# Patient Record
(demographics unavailable — no encounter records)

---

## 2024-10-10 NOTE — PLAN
[Cognitive and/or Behavior Therapy] : Cognitive and/or Behavior Therapy  [Motivational Interviewing] : Motivational Interviewing  [Psychodynamic Therapy] : Psychodynamic Therapy  [Psychoeducation] : Psychoeducation  [Skills training (all types)] : Skills training (all types)  [Supportive Therapy] : Supportive Therapy [FreeTextEntry4] : Kaushik Selby is a 50 yo M with PMH, single, no kids, unfinished high school (was in special ed), used to be a  for 30 years, last worked in 2017, lives alone in supportive housing since 2008, PMH of cellulitis, enuresis and urinary retention, s/p suprapubic catheter in 1/2024, hx of hypothyroid (previously on levothyroxine, states that his doctor states he no longer needs levothyroxine supplementation), PPH of reported bipolar disorder vs schizoaffective disorder bipolar type, hx of multiple inpatient psychiatric admissions (most recently in 2016 at [facility] IP for reported depression, previously in 2001 at [place] x2m, then transferred [facility] x4m involuntary, then transferred to [facility] x2m), hx outpt psychiatric treatment (at [facility] x18y, has had Dr. Miller in the past), hx of multiple medication trials (Seroquel, Depakote, Topamax, lithium; reports he has an allergy to haldol), family hx of biological father with reported bipolar disorder (r/o PTSD), prescribed Abilify 30 mg (x10 years) and Klonopin 0.5mg bid by [facility], who presents to [facility] outpatient for medication management.  On initial interview, patient describes hx consistent with bipolar disorder vs schizoaffective disorder bipolar type. Speech is pressured, suspected due to anxiety, but patient does not present as acutely manic, depressed, or overtly psychotic. Patient describes at least 1 prior hospitalization for carlie. Although pt denies a hx of AH/VH, patient presents as paranoid (unclear if due to psychosis or due to personality disorder) and describes a hx of possible erotomaniac delusions (states that >20 years ago, he felt that he needed to  a woman after 4 months that he states that he barely knew). Patient denies any current or prior SI/HI/NSSIB. Patient endorses daily nicotine use and caffeine use, denies other substance use. Discussed risks associated with chronic benzo use including but not limited to tolerance, withdrawal sxs, cognitive impairment. Discussed importance of adherence to abilify for mood stabilization. Pt expressed understanding and agreement with plan.   Impression: - schizoaffective disorder, bipolar type  - r/o bipolar disorder and paranoid personality disorder  - anxiety disorder, unspecified r/o SIMD with anxious distress - nicotine use disorder, cigarettes (<1/2 PPD) and nicotine vaping  - caffeine use   Plan:  #schizoaffective disorder, bipolar type r/o bipolar with paranoid PD  - continue ability 30 mg qdaily in AM, states this has been the best med for mood stabilization for him in past 10 years - extensive hx of prior meds, most of which he states helped at least somewhat - endorses allergy to haldol based on possible adverse reaction when 7yo  # anxiety disorder - decrease klonopin to 0.25 bid PRN (0.5mg tab, pt instructed to split in half); (previously was taking 0.5 mg qAM PRN, 0.25 mg qPM PRN); RAB discussed - ISTOP reviewed; of note, last script was klonopin 0.5 mg bid on 8/31/24; prior script was 0.5 mg bid on 06/01/24  #nicotine use disorder  - cigarette use and vaping use  - not interested in cutting down at this time   #caffeine use, 2-3 cups of coffee a day  - likely contributing to anxiety; discussed risks of caffeine for worsening anxiety (states this is why he was taking higher dose of klonopin in AM)  #f/u - f/u in 2 weeks for now  - [facility] pharmacy

## 2024-10-10 NOTE — REASON FOR VISIT
[Other Location: e.g. Home (Enter Location, City,State)___] : The patient, [unfilled], was located at [unfilled] at the time of the visit. [Patient] : Patient [FreeTextEntry1] : depression, loneliness

## 2024-10-10 NOTE — HISTORY OF PRESENT ILLNESS
[FreeTextEntry1] : Preferred phone (cell): [phone]  Cc: "I'm stable and medication compliant. I'm interested in switching clinics" (from [facility] outpatient to [facility] outpatient)  Kaushik Selby is a 52 yo M with PMH, single, no kids, unfinished high school (was in special ed), used to be a  for 30 years, last worked in 2017, lives alone in supportive housing since 2008, PMH of cellulitis, enuresis and urinary retention, s/p suprapubic catheter in 1/2024, hx of hypothyroid (previously on levothyroxine, states that his doctor states he no longer needs levothyroxine supplementation), PPH of reported bipolar disorder vs schizoaffective disorder bipolar type, hx of multiple inpatient psychiatric admissions (most recently in 2016 at [facility] for reported depression, previously in 2001 at [place] x2m, then transferred [facility] x4m involuntary, then transferred to [facility] x2m), hx outpt psychiatric treatment (at [facility] x18y, has had Dr. Miller in the past), hx of multiple medication trials (Seroquel, Depakote, Topamax, lithium; reports he has an allergy to haldol), family hx of biological father with reported bipolar disorder (r/o PTSD), prescribed Abilify 30 mg (x10 years) and Klonopin 0.5mg bid by [facility], who presents to [facility] outpatient for medication management.  On approach, pt presents as anxious-appearing, polite, and cooperative on interview. Speech was concrete, overproductive, somewhat overinclusive, at times difficult to follow from sentence to sentence (train of thought is not entirely linear). Of note, at times, pt seemed to misuse psychiatric terminology. Patient described that he was nervous since yesterday to come to appointment due to coming to a new doctor, but otherwise denied specific concerns or anxieties. Patient describes that he was a patient at [facility] for at least 18 years, but states thate he was frustrated about being at the clinic, and states that "I'm looking for a change" in his provider. Endorses frustration with getting his medications filled at [facility], and some difficulties reaching his psychiatrist at [facility]. Patient repeatedly stated that he has been adherent with his medications, and states that he has never been violent towards himself or anyone else. States that he has been on a number of psychiatric medications over the years, states that abilify works best for him, which he feels improves his mood stability. Patient states that he has been taking klonopin generally 1 tab (0.5 mg) in the morning, and a half a tab at night, but states that he takes it as needed. Patient states that he takes the higher dose of klonopin in the morning "because I take it with coffee" and states that otherwise he feels anxious after drinking coffee (2-3 cups a day). Discussed risks of caffeine use on worsening anxiety. Recommended cutting down on caffeine consumption/switching to decaf coffee, as well as cutting down on AM klonopin to half a tab. Patient expressed agreement with plan. Pt endorses daily smoking and vaping, not interested in cutting down at this time. Denies marijuana use or other substance use.  Patient describes that "manic" and "depressed" at times throughout the day. But when screened for current true manic and depressive sxs, pt denies current acute depression and carlie. When screened further for recent manic episodes, patient describes that he had decreased sleep need and subjective restlessness last when his mother passed away in 2021 (even this description seemed more consistent with grief/anxiety). Patient denies current or prior auditory or visual hallucinations. Superficially denies paranoia, but at times seems somewhat paranoid/distrustful on interview. Patient denies current or prior suicidal ideation, homicidal ideation, NSSIB. Denies access to guns/weapons.   Pt endorses adherence to medications, denies side effects.   Prior psychiatric hx: psychiatric hx, states that at 9yo, he was "clapping, twitching, and hyperactive," states that he had his first episode of depression at approximately 9 yo, reports he has been engaged in psychiatric treatment since.   Social hx: grew up in , moved to  in 1995. Completed JR year of HS, did not obtain GED.   Preferred RX: [facility] [address]  [FreeTextEntry2] : see above [FreeTextEntry3] : see above

## 2024-10-10 NOTE — PHYSICAL EXAM
[Average] : average [Cooperative] : cooperative [Anxious] : anxious [Irritable] : irritability [Overproductive] : overproductive [Linear/Goal Directed] : linear/goal directed [MR] : MR [WNL] : within normal limits [de-identified] : below average

## 2024-10-10 NOTE — HISTORY OF PRESENT ILLNESS
[FreeTextEntry1] : Preferred phone (cell): [phone]  Cc: "I'm stable and medication compliant. I'm interested in switching clinics" (from [facility] outpatient to [facility] outpatient)  Kaushik Selby is a 52 yo M with PMH, single, no kids, unfinished high school (was in special ed), used to be a  for 30 years, last worked in 2017, lives alone in supportive housing since 2008, PMH of cellulitis, enuresis and urinary retention, s/p suprapubic catheter in 1/2024, hx of hypothyroid (previously on levothyroxine, states that his doctor states he no longer needs levothyroxine supplementation), PPH of reported bipolar disorder vs schizoaffective disorder bipolar type, hx of multiple inpatient psychiatric admissions (most recently in 2016 at [facility] for reported depression, previously in 2001 at [place] x2m, then transferred [facility] x4m involuntary, then transferred to [facility] x2m), hx outpt psychiatric treatment (at [facility] x18y, has had Dr. Miller in the past), hx of multiple medication trials (Seroquel, Depakote, Topamax, lithium; reports he has an allergy to haldol), family hx of biological father with reported bipolar disorder (r/o PTSD), prescribed Abilify 30 mg (x10 years) and Klonopin 0.5mg bid by [facility], who presents to [facility] outpatient for medication management.  On approach, pt presents as anxious-appearing, polite, and cooperative on interview. Speech was concrete, overproductive, somewhat overinclusive, at times difficult to follow from sentence to sentence (train of thought is not entirely linear). Of note, at times, pt seemed to misuse psychiatric terminology. Patient described that he was nervous since yesterday to come to appointment due to coming to a new doctor, but otherwise denied specific concerns or anxieties. Patient describes that he was a patient at [facility] for at least 18 years, but states thate he was frustrated about being at the clinic, and states that "I'm looking for a change" in his provider. Endorses frustration with getting his medications filled at [facility] pharmacy, and some difficulties reaching his psychiatrist at [facility]. Patient repeatedly stated that he has been adherent with his medications, and states that he has never been violent towards himself or anyone else. States that he has been on a number of psychiatric medications over the years, states that abilify works best for him, which he feels improves his mood stability. Patient states that he has been taking klonopin generally 1 tab (0.5 mg) in the morning, and a half a tab at night, but states that he takes it as needed. Patient states that he takes the higher dose of klonopin in the morning "because I take it with coffee" and states that otherwise he feels anxious after drinking coffee (2-3 cups a day). Discussed risks of caffeine use on worsening anxiety. Recommended cutting down on caffeine consumption/switching to decaf coffee, as well as cutting down on AM klonopin to half a tab. Patient expressed agreement with plan. Pt endorses daily smoking and vaping, not interested in cutting down at this time. Denies marijuana use or other substance use.  Patient describes that "manic" and "depressed" at times throughout the day. But when screened for current true manic and depressive sxs, pt denies current acute depression and carlie. When screened further for recent manic episodes, patient describes that he had decreased sleep need and subjective restlessness last when his mother passed away in 2021 (even this description seemed more consistent with grief/anxiety). Patient denies current or prior auditory or visual hallucinations. Superficially denies paranoia, but at times seems somewhat paranoid/distrustful on interview. Patient denies current or prior suicidal ideation, homicidal ideation, NSSIB. Denies access to guns/weapons.   Pt endorses adherence to medications, denies side effects.   Prior psychiatric hx: psychiatric hx, states that at 9yo, he was "clapping, twitching, and hyperactive," states that he had his first episode of depression at approximately 7 yo, reports he has been engaged in psychiatric treatment since.   Social hx: grew up in , moved to  in 1995. Completed JR year of HS, did not obtain GED.   Preferred RX: [facility] [address] [facility]  [FreeTextEntry2] : see above [FreeTextEntry3] : see above

## 2024-10-10 NOTE — PHYSICAL EXAM
[Average] : average [Cooperative] : cooperative [Anxious] : anxious [Irritable] : irritability [Overproductive] : overproductive [Linear/Goal Directed] : linear/goal directed [MR] : MR [WNL] : within normal limits [de-identified] : below average

## 2024-10-10 NOTE — PLAN
[Cognitive and/or Behavior Therapy] : Cognitive and/or Behavior Therapy  [Motivational Interviewing] : Motivational Interviewing  [Psychodynamic Therapy] : Psychodynamic Therapy  [Psychoeducation] : Psychoeducation  [Skills training (all types)] : Skills training (all types)  [Supportive Therapy] : Supportive Therapy [FreeTextEntry4] : Kaushik Selby is a 50 yo M with PMH, single, no kids, unfinished high school (was in special ed), used to be a  for 30 years, last worked in 2017, lives alone in supportive housing since 2008, PMH of cellulitis, enuresis and urinary retention, s/p suprapubic catheter in 1/2024, hx of hypothyroid (previously on levothyroxine, states that his doctor states he no longer needs levothyroxine supplementation), PPH of reported bipolar disorder vs schizoaffective disorder bipolar type, hx of multiple inpatient psychiatric admissions (most recently in 2016 at [facility] IP for reported depression, previously in 2001 at [place] x2m, then transferred [facility] x4m involuntary, then transferred to [facility] x2m), hx outpt psychiatric treatment (at [facility] x18y, has had Dr. Miller in the past), hx of multiple medication trials (Seroquel, Depakote, Topamax, lithium; reports he has an allergy to haldol), family hx of biological father with reported bipolar disorder (r/o PTSD), prescribed Abilify 30 mg (x10 years) and Klonopin 0.5mg bid by [facility], who presents to [facility] outpatient for medication management.  On initial interview, patient describes hx consistent with bipolar disorder vs schizoaffective disorder bipolar type. Speech is pressured, suspected due to anxiety, but patient does not present as acutely manic, depressed, or overtly psychotic. Patient describes at least 1 prior hospitalization for carlie. Although pt denies a hx of AH/VH, patient presents as paranoid (unclear if due to psychosis or due to personality disorder) and describes a hx of possible erotomaniac delusions (states that >20 years ago, he felt that he needed to  a woman after 4 months that he states that he barely knew). Patient denies any current or prior SI/HI/NSSIB. Patient endorses daily nicotine use and caffeine use, denies other substance use. Discussed risks associated with chronic benzo use including but not limited to tolerance, withdrawal sxs, cognitive impairment. Discussed importance of adherence to abilify for mood stabilization. Pt expressed understanding and agreement with plan.   Impression: - schizoaffective disorder, bipolar type  - r/o bipolar disorder and paranoid personality disorder  - anxiety disorder, unspecified r/o SIMD with anxious distress - nicotine use disorder, cigarettes (<1/2 PPD) and nicotine vaping  - caffeine use   Plan:  #schizoaffective disorder, bipolar type r/o bipolar with paranoid PD  - continue ability 30 mg qdaily in AM, states this has been the best med for mood stabilization for him in past 10 years - extensive hx of prior meds, most of which he states helped at least somewhat - endorses allergy to haldol based on possible adverse reaction when 7yo  # anxiety disorder - decrease klonopin to 0.25 bid PRN (0.5mg tab, pt instructed to split in half); (previously was taking 0.5 mg qAM PRN, 0.25 mg qPM PRN); RAB discussed - ISTOP reviewed; of note, last script was klonopin 0.5 mg bid on 8/31/24; prior script was 0.5 mg bid on 06/01/24  #nicotine use disorder  - cigarette use and vaping use  - not interested in cutting down at this time   #caffeine use, 2-3 cups of coffee a day  - likely contributing to anxiety; discussed risks of caffeine for worsening anxiety (states this is why he was taking higher dose of klonopin in AM)  #f/u - f/u in 2 weeks for now  - [facility]

## 2024-10-10 NOTE — END OF VISIT
[] : Resident [Resident] : Resident [FreeTextEntry3] : Pt is seen and evaluated with resident. Agree with the written above. Treatment plan was discussed with the resident and pt

## 2024-10-16 NOTE — END OF VISIT
[Duration of Psychotherapy Visit (minutes spent in synchronous communication): ____] : Duration of Psychotherapy Visit (minutes spent in synchronous communication): [unfilled] [Individual Psychotherapy for 38-52 minutes] : Individual Psychotherapy for 38 - 52 minutes [FreeTextEntry3] : office [FreeTextEntry5] : office

## 2024-10-16 NOTE — PLAN
[facility] phone call message- general phone call:    Reason for call: Patient states he had stool sample done here but they don't see it so they are calling to check if he did get done here. Please call and advise.     Return call needed: Yes    OK to leave a message on voice mail? Yes, can leave a vm yes or no so they can let the pt know at his appt.     Primary language: English      needed? No    Call taken on September 22, 2017 at 11:14 AM by Everette Wood   [FreeTextEntry2] : treatment plan will be developed during the upcoming sessions [Cognitive and/or Behavior Therapy] : Cognitive and/or Behavior Therapy  [Motivational Interviewing] : Motivational Interviewing  [Psychodynamic Therapy] : Psychodynamic Therapy  [Psychoeducation] : Psychoeducation  [Skills training (all types)] : Skills training (all types)  [Supportive Therapy] : Supportive Therapy [de-identified] : The therapist met the patient in person for the session. The patient stated that he keeps himself busy, went to a sweet 16th, and engagement party. The patient was still interested in attending [employer] and stated that he didn't have a chance to go for a tour but will try to do it this week if his schedule permits. The patient asked lots of questions about his intake with the psychiatrist and why it was done the way it was done, as well as the patient was questioning why he was seen by two doctors instead of one. The patient was educated that the second doctor was a supervisor. The patient also stated, " i am not violent so". The patient was assured that there is no record of his violence so he shouldn't be concerned about it. The patient also asked the writer to contact his psychiatrist to inform him that the patient might not have enough medication and then stated that he should have enough till the next week when he sees his psychiatrist. In addition, the patient stated that he started seeing some dreams about his mother and will inform his psychiatrist during the upcoming visit. Overall the patinet stated that he is doing ok. The patient takes medications as prescribed. Throughout the session the patient was provided with active listening, motivational interviewing, emotional support and empathy.  [Recommended Frequency of Visits: ____] : Recommended frequency of visits: [unfilled] [Return in ____ week(s)] : Return in [unfilled] week(s) [FreeTextEntry1] : The therapist will meet the patient every 2-3 weeks

## 2024-10-16 NOTE — PLAN
[FreeTextEntry2] : treatment plan will be developed during the upcoming sessions [Cognitive and/or Behavior Therapy] : Cognitive and/or Behavior Therapy  [Motivational Interviewing] : Motivational Interviewing  [Psychodynamic Therapy] : Psychodynamic Therapy  [Psychoeducation] : Psychoeducation  [Skills training (all types)] : Skills training (all types)  [Supportive Therapy] : Supportive Therapy [de-identified] : The therapist met the patient in person for the session. The patient stated that he keeps himself busy, went to a sweet 16th, and engagement party. The patient was still interested in attending [employer] and stated that he didn't have a chance to go for a tour but will try to do it this week if his schedule permits. The patient asked lots of questions about his intake with the psychiatrist and why it was done the way it was done, as well as the patient was questioning why he was seen by two doctors instead of one. The patient was educated that the second doctor was a supervisor. The patient also stated, " i am not violent so". The patient was assured that there is no record of his violence so he shouldn't be concerned about it. The patient also asked the writer to contact his psychiatrist to inform him that the patient might not have enough medication and then stated that he should have enough till the next week when he sees his psychiatrist. In addition, the patient stated that he started seeing some dreams about his mother and will inform his psychiatrist during the upcoming visit. Overall the patinet stated that he is doing ok. The patient takes medications as prescribed. Throughout the session the patient was provided with active listening, motivational interviewing, emotional support and empathy.  [Recommended Frequency of Visits: ____] : Recommended frequency of visits: [unfilled] [Return in ____ week(s)] : Return in [unfilled] week(s) [FreeTextEntry1] : The therapist will meet the patient every 2-3 weeks

## 2024-10-16 NOTE — REASON FOR VISIT
[Other Location: e.g. Home (Enter Location, City,State)___] : The patient, [unfilled], was located at [unfilled] at the time of the visit. [FreeTextEntry4] : 10 am [FreeTextEntry5] : 1045 am [Patient] : Patient [FreeTextEntry1] : depression, loneliness

## 2024-10-22 NOTE — HISTORY OF PRESENT ILLNESS
[FreeTextEntry1] : Preferred phone (cell): [phone] Cc: "I'm a little anxious about all of the things I need to do" (errands)   Kaushik Selby is a 50 yo M with PMH, single, no kids, unfinished high school (was in special ed), used to be a  for 30 years, last worked in 2017, lives alone in supportive housing since 2008, PMH of cellulitis, enuresis and urinary retention, s/p suprapubic catheter in 1/2024, hx of hypothyroid (previously on levothyroxine, states that his doctor states he no longer needs levothyroxine supplementation), PPH of reported bipolar disorder vs schizoaffective disorder bipolar type, hx of multiple inpatient psychiatric admissions (most recently in 2016 at [facility] for reported depression, previously in 2001 at [place] x2m, then transferred [facility] x4m involuntary, then transferred to [facility] x2m), hx outpt psychiatric treatment (at [facility] x18y, has had Dr. Miller in the past), hx of multiple medication trials (Seroquel, Depakote, Topamax, lithium; reports he has an allergy to haldol), family hx of biological father with reported bipolar disorder (r/o PTSD), prescribed Abilify 30 mg (x10 years) and Klonopin 0.5mg bid by [facility], who presents to [facility] outpatient for medication management.  On approach, pt presents as mildly anxious-appearing (but less so compared to last appt), polite, and cooperative on interview. Speech was concrete.   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 Patient denies acute sxs of panic attacks, depression, or carlie. Sleep and appetite stable. Encouraged pt to avoid caffeine intake in afternoon/evening. Pt states that he continues to take klonopin 0.5 mg in AM and 0.25 in PM PRN, inconsistently. Extensively discussed risks of benzo use; pt willing to cut down to 0.25 bid PRN. Pt states he smokes about 3 cigarettes per day; discussed NRT, amenable to starting nicotine patch. Provided supportive psychotherapy. Patient denies current or prior suicidal ideation, homicidal ideation, NSSIB.   Pt endorses adherence to medications, denies side effects.   Preferred [facility]: [facility] [id number] [facility]

## 2024-10-22 NOTE — DISCUSSION/SUMMARY
[Low acute suicide risk] : Low acute suicide risk [Yes] : Yes [FreeTextEntry1] : Kaushik Selby is a 50 yo M with PMH, single, no kids, unfinished high school (was in special ed), used to be a  for 30 years, last worked in 2017, lives alone in supportive housing since 2008, PMH of cellulitis, enuresis and urinary retention, s/p suprapubic catheter in 1/2024, hx of hypothyroid (previously on levothyroxine, states that his doctor states he no longer needs levothyroxine supplementation), PPH of reported bipolar disorder vs schizoaffective disorder bipolar type, hx of multiple inpatient psychiatric admissions (most recently in 2016 at [facility] for reported depression, previously in 2001 at [place] x2m, then transferred [facility] x4m involuntary, then transferred to [facility] x2m), hx outpt psychiatric treatment (at [facility] x18y, has had Dr. Miller in the past), hx of multiple medication trials (Seroquel, Depakote, Topamax, lithium; reports he has an allergy to haldol), family hx of biological father with reported bipolar disorder (r/o PTSD), prescribed Abilify 30 mg (x10 years) and Klonopin 0.5mg bid by [facility], who presents to [facility] outpatient for medication management.  On initial interview, patient describes hx consistent with bipolar disorder vs schizoaffective disorder bipolar type. Speech is pressured, suspected due to anxiety, but patient does not present as acutely manic, depressed, or overtly psychotic. Patient describes at least 1 prior hospitalization for carlie. Although pt denies a hx of AH/VH, patient presents as paranoid (unclear if due to psychosis or due to personality disorder) and describes a hx of possible erotomaniac delusions (states that >20 years ago, he felt that he needed to  a woman after 4 months that he states that he barely knew). Patient denies any current or prior SI/HI/NSSIB. Patient endorses daily nicotine use and caffeine use, denies other substance use. Discussed risks associated with chronic benzo use including but not limited to tolerance, withdrawal sxs, cognitive impairment. Discussed importance of adherence to abilify for mood stabilization. Pt expressed understanding and agreement with plan.   Impression: - schizoaffective disorder, bipolar type  - r/o bipolar disorder and paranoid personality disorder  - anxiety disorder, unspecified r/o SIMD with anxious distress - nicotine use disorder, cigarettes (<1/2 PPD) and nicotine vaping  - caffeine use - possible cognitive impairment   Plan:  #schizoaffective disorder, bipolar type r/o bipolar with paranoid PD  - continue ability 30 mg qdaily in AM, states this has been the best med for mood stabilization for him in past 10 years - extensive hx of prior meds, most of which he states helped at least somewhat - endorses allergy to haldol based on possible adverse reaction when 9yo  # anxiety disorder - decrease klonopin to 0.25 bid PRN (0.5mg tab, pt instructed to split in half); (previously was taking 0.5 mg qAM PRN, 0.25 mg qPM PRN) - start gabapentin 300 mg bid PRN acute anxiety (alternative to klonopin); RAB discussed  - ISTOP reviewed; of note, last script was klonopin 0.5 mg bid on 8/31/24; prior script was 0.5 mg bid on 06/01/24  #nicotine use disorder  - cigarette use and vaping use  - start nicotine patch 7mg qdaily; RAB discussed   #caffeine use, 2-3 cups of coffee a day  - likely contributing to anxiety; discussed risks of caffeine for worsening anxiety (states this is why he was taking higher dose of klonopin in AM) - pt started drinking decaf coffee   #f/u - [facility] pharmacy

## 2024-10-22 NOTE — PHYSICAL EXAM
[Average] : average [Cooperative] : cooperative [Anxious] : anxious [Irritable] : irritability [Overproductive] : overproductive [Linear/Goal Directed] : linear/goal directed [MR] : MR [WNL] : within normal limits [de-identified] : below average

## 2024-11-12 NOTE — PLAN
[FreeTextEntry2] : Goal 1. The patient will recognize and improve daily symptoms of his anxiety, and bipolar 1 diagnosis as evidenced by an improvement in scores on mental health scales reviewed in therapy sessions every 1 year   Obj 1- The patient will attend therapy sessions every 2-3 weeks and will review symptoms and coping skills during sessions. The patient will practice 1 meaningful activity a day  Obj 2- The patient will take daily meds as prescribed and meet with psychiatrist as scheduled   Goal 2: The patient will increase socialization    Obj 1- The patient will start attending [facility] at least 1/week   Obj 2 - The therapist and the patient will discuss progress his socialization progress during therapy sessions     [Cognitive and/or Behavior Therapy] : Cognitive and/or Behavior Therapy  [Motivational Interviewing] : Motivational Interviewing  [Psychodynamic Therapy] : Psychodynamic Therapy  [Psychoeducation] : Psychoeducation  [Skills training (all types)] : Skills training (all types)  [Supportive Therapy] : Supportive Therapy [de-identified] : The therapist met the patient in person for the session. The patient reported a lot of dissatisfaction with the way the housing organization and case management operates at the organization that provides supportive housing to the patient. The patient spoke a lot about different issues, providing the names, titles, the dates and other details of the past situations, the patient spoke really fast, hardly allowing the therapist to talk. At some point the therapist had to stop the patient so that the patient would share his thoughts about the options/ideas to resolve the situation. The patient's main concern was that his case workers are changing every year, and he would like consistency. Also, he stated that his new  doesn't listen to the patient, his needs and concerns. The patient stated that he doesn't know what to do and if he needs to do anything not to make things worse. The patient was educated about non-for-profit organization and the fact that neither the patient nor therapist have control over the changes the patient has to go through at his supportive housing. The therapist asked the patient to focus on resolution of the situation and asked the patient if he feels that the letter to the organization with the request to accommodate the patient with the change of his new  would be something that the patient might benefit from. The patient stated "yes". The therapist wrote a letter to the case management with the request to change his . The patient stated that it was a good idea even though he doesn't think it will change match. In addition, the therapist and the patient spoke about possible goals for his treatment plan and the patient stated that his long-term goal is to increase socialization and work on his mood. The patient is planning to come for his sessions in person most of the time. The therapist reminded the patient about the info the therapist provided to the patient about the [facility] and the patient stated that he is working on it " i was too busy but i am interested" The patient takes medications as prescribed. Throughout the session the patient was provided with active listening, motivational interviewing, emotional support and empathy      *** The patient's treatment plan is late because he canceled his 10/31/24 appointment.   [Recommended Frequency of Visits: ____] : Recommended frequency of visits: [unfilled] [Return in ____ week(s)] : Return in [unfilled] week(s) [FreeTextEntry1] : The therapist will meet the patient every 2-3 weeks

## 2024-11-12 NOTE — PLAN
[FreeTextEntry2] : Goal 1. The patient will recognize and improve daily symptoms of his anxiety, and bipolar 1 diagnosis as evidenced by an improvement in scores on mental health scales reviewed in therapy sessions every 1 year   Obj 1- The patient will attend therapy sessions every 2-3 weeks and will review symptoms and coping skills during sessions. The patient will practice 1 meaningful activity a day  Obj 2- The patient will take daily meds as prescribed and meet with psychiatrist as scheduled   Goal 2: The patient will increase socialization    Obj 1- The patient will start attending [facility] at least 1/week   Obj 2 - The therapist and the patient will discuss progress his socialization progress during therapy sessions     [Cognitive and/or Behavior Therapy] : Cognitive and/or Behavior Therapy  [Motivational Interviewing] : Motivational Interviewing  [Psychodynamic Therapy] : Psychodynamic Therapy  [Psychoeducation] : Psychoeducation  [Skills training (all types)] : Skills training (all types)  [Supportive Therapy] : Supportive Therapy [de-identified] : The therapist met the patient in person for the session. The patient reported a lot of dissatisfaction with the way the housing organization and case management operates at the organization that provides supportive housing to the patient. The patient spoke a lot about different issues, providing the names, titles, the dates and other details of the past situations, the patient spoke really fast, hardly allowing the therapist to talk. At some point the therapist had to stop the patient so that the patient would share his thoughts about the options/ideas to resolve the situation. The patient's main concern was that his case workers are changing every year, and he would like consistency. Also, he stated that his new  doesn't listen to the patient, his needs and concerns. The patient stated that he doesn't know what to do and if he needs to do anything not to make things worse. The patient was educated about non-for-profit organization and the fact that neither the patient nor therapist have control over the changes the patient has to go through at his supportive housing. The therapist asked the patient to focus on resolution of the situation and asked the patient if he feels that the letter to the organization with the request to accommodate the patient with the change of his new  would be something that the patient might benefit from. The patient stated "yes". The therapist wrote a letter to the case management with the request to change his . The patient stated that it was a good idea even though he doesn't think it will change match. In addition, the therapist and the patient spoke about possible goals for his treatment plan and the patient stated that his long-term goal is to increase socialization and work on his mood. The patient is planning to come for his sessions in person most of the time. The therapist reminded the patient about the info the therapist provided to the patient about the [facility] and the patient stated that he is working on it " i was too busy but i am interested" The patient takes medications as prescribed. Throughout the session the patient was provided with active listening, motivational interviewing, emotional support and empathy      *** The patient's treatment plan is late because he canceled his 10/31/24 appointment.   [Recommended Frequency of Visits: ____] : Recommended frequency of visits: [unfilled] [Return in ____ week(s)] : Return in [unfilled] week(s) [FreeTextEntry1] : The therapist will meet the patient every 2-3 weeks

## 2024-11-12 NOTE — ADDENDUM
[FreeTextEntry1] : The therapist met the patient in person for the session. The patient reported a lot of dissatisfaction with the way the housing organization and case management operates at the organization that provides supportive housing to the patient. The patient spoke a lot about different issues, providing the names, titles, the dates and other details of the past situations, the patient spoke really fast, hardly allowing the therapist to talk. At some point the therapist had to stop the patient so that the patient would share his thoughts about the options/ideas to resolve the situation. The patient's main concern was that his case workers are changing every year, and he would like consistency. Also, he stated that his new  doesn't listen to the patient, his needs and concerns. The patient stated that he doesn't know what to do and if he needs to do anything not to make things worse. The patient was educated about non-for-profit organization and the fact that neither the patient nor therapist have control over the changes the patient has to go through at his supportive housing. The therapist asked the patient to focus on resolution of the situation and asked the patient if he feels that the letter to the organization with the request to accommodate the patient with the change of his new  would be something that the patient might benefit from. The patient stated "yes". The therapist wrote a letter to the case management with the request to change his . The patient stated that it was a good idea even though he doesn't think it will change match. In addition, the therapist and the patient spoke about possible goals for his treatment plan and the patient stated that his long-term goal is to increase socialization and work on his mood. The patient is planning to come for his sessions in person most of the time. The therapist reminded the patient about the info the therapist provided to the patient about the [facility] and the patient stated that he is working on it " i was too busy but i am interested" The patient takes medications as prescribed. Throughout the session the patient was provided with active listening, motivational interviewing, emotional support and empathy

## 2024-11-12 NOTE — DISCUSSION/SUMMARY
[Initial Plan] : Initial Plan [Attempting to realize their potential] : Attempting to realize their potential [Motivated to maintain or improve physical health] : motivated to maintain or improve physical health [FreeTextEntry2] : 11/12/25 [FreeTextEntry3] : 10/9/24 [FreeTextEntry8] : no barriers [Mental Health] : Mental Health [Social/Leisure] : Social/Leisure [Initial] : Initial [every ___ months] : every [unfilled] months [every ___ weeks] : every [unfilled] weeks [Other rationale for transition/discharge:] : Other rationale for transition/discharge: [None - Reason others did not participate:] : None - Reason others did not participate:  [Yes] : Yes [Psychiatric Provider/Prescriber] : Psychiatric Provider/Prescriber [Therapist] : Therapist [Supervisor (if needed)] : Supervisor [FreeTextEntry1] : feels lonely [FreeTextEntry4] : Goal 2: The patient will increase socialization   [de-identified] : The patient will send out her resume weekly.   [de-identified] : 11/12/25 [de-identified] : The patient will discuss the progress of her job search during therapy sessions bi-weekly.  [de-identified] : 11/12/25 [FreeTextEntry5] : The therapist will utilize CBT techniques, Psychoeducation, Motivational interviewing and Supportive therapy  [de-identified] : Dr. Adler [de-identified] : Po [de-identified] : When a patient no longer requires individual psychotherapy and medication in order to perform at baseline, the patient will be discharged.    [de-identified] : not clinically necessary

## 2024-11-12 NOTE — DISCUSSION/SUMMARY
[Initial Plan] : Initial Plan [Attempting to realize their potential] : Attempting to realize their potential [Motivated to maintain or improve physical health] : motivated to maintain or improve physical health [FreeTextEntry2] : 11/12/25 [FreeTextEntry3] : 10/9/24 [FreeTextEntry8] : no barriers [Mental Health] : Mental Health [Social/Leisure] : Social/Leisure [Initial] : Initial [every ___ months] : every [unfilled] months [every ___ weeks] : every [unfilled] weeks [Other rationale for transition/discharge:] : Other rationale for transition/discharge: [None - Reason others did not participate:] : None - Reason others did not participate:  [Yes] : Yes [Psychiatric Provider/Prescriber] : Psychiatric Provider/Prescriber [Therapist] : Therapist [Supervisor (if needed)] : Supervisor [FreeTextEntry1] : feels lonely [FreeTextEntry4] : Goal 2: The patient will increase socialization   [de-identified] : The patient will send out her resume weekly.   [de-identified] : 11/12/25 [de-identified] : The patient will discuss the progress of her job search during therapy sessions bi-weekly.  [de-identified] : 11/12/25 [FreeTextEntry5] : The therapist will utilize CBT techniques, Psychoeducation, Motivational interviewing and Supportive therapy  [de-identified] : Dr. Adler [de-identified] : Po [de-identified] : When a patient no longer requires individual psychotherapy and medication in order to perform at baseline, the patient will be discharged.    [de-identified] : not clinically necessary

## 2024-11-12 NOTE — ADDENDUM
[FreeTextEntry1] : The therapist met the patient in person for the session. The patient reported a lot of dissatisfaction with the way the housing organization and case management operates at the organization that provides supportive housing to the patient. The patient spoke a lot about different issues, providing the names, titles, the dates and other details of the past situations, the patient spoke really fast, hardly allowing the therapist to talk. At some point the therapist had to stop the patient so that the patient would share his thoughts about the options/ideas to resolve the situation. The patient's main concern was that his case workers are changing every year, and he would like consistency. Also, he stated that his new  doesn't listen to the patient, his needs and concerns. The patient stated that he doesn't know what to do and if he needs to do anything not to make things worse. The patient was educated about non-for-profit organization and the fact that neither the patient nor therapist have control over the changes the patient has to go through at his supportive housing. The therapist asked the patient to focus on resolution of the situation and asked the patient if he feels that the letter to the organization with the request to accommodate the patient with the change of his new  would be something that the patient might benefit from. The patient stated "yes". The therapist wrote a letter to the case management with the request to change his . The patient stated that it was a good idea even though he doesn't think it will change match. In addition, the therapist and the patient spoke about possible goals for his treatment plan and the patient stated that his long-term goal is to increase socialization and work on his mood. The patient is planning to come for his sessions in person most of the time. The therapist reminded the patient about the info the therapist provided to the patient about the [facility] and the patient stated that he is working on it " i was too busy but i am interested" The patient takes medications as prescribed. Throughout the session the patient was provided with active listening, motivational interviewing, emotional support and empathy

## 2024-11-19 NOTE — DISCUSSION/SUMMARY
[Low acute suicide risk] : Low acute suicide risk [Yes] : Yes [FreeTextEntry1] : Kaushik Selby is a 52 yo M with PMH, single, no kids, unfinished high school (was in special ed), used to be a  for 30 years, last worked in 2017, lives alone in supportive housing since 2008, PMH of cellulitis, enuresis and urinary retention, s/p suprapubic catheter in 1/2024, hx of hypothyroid (previously on levothyroxine, states that his doctor states he no longer needs levothyroxine supplementation), PPH of reported bipolar disorder vs schizoaffective disorder bipolar type, hx of multiple inpatient psychiatric admissions (most recently in 2016 at [facility] IP for reported depression, previously in 2001 at [place] x2m, then transferred [facility] x4m involuntary, then transferred to [facility] x2m), hx outpt psychiatric treatment (at [facility] x18y, has had Dr. Miller in the past), hx of multiple medication trials (Seroquel, Depakote, Topamax, lithium; reports he has an allergy to haldol), family hx of biological father with reported bipolar disorder (r/o PTSD), prescribed Abilify 30 mg (x10 years) and Klonopin 0.5mg bid by [facility], who presents to [facility] outpatient for medication management.  On initial interview, patient describes hx consistent with bipolar disorder vs schizoaffective disorder bipolar type. Speech is pressured, suspected due to anxiety, but patient does not present as acutely manic, depressed, or overtly psychotic. Patient describes at least 1 prior hospitalization for carlie. Although pt denies a hx of AH/VH, patient presents as paranoid (unclear if due to psychosis or due to personality disorder) and describes a hx of possible erotomaniac delusions (states that >20 years ago, he felt that he needed to  a woman after 4 months that he states that he barely knew). Patient denies any current or prior SI/HI/NSSIB. Patient endorses daily nicotine use and caffeine use, denies other substance use. Discussed risks associated with chronic benzo use including but not limited to tolerance, withdrawal sxs, cognitive impairment. Discussed importance of adherence to abilify for mood stabilization. Pt expressed understanding and agreement with plan.   Impression: - schizoaffective disorder, bipolar type  - r/o bipolar disorder and paranoid personality disorder  - anxiety disorder, unspecified r/o SIMD with anxious distress - nicotine use disorder, cigarettes (<1/2 PPD) and nicotine vaping  - caffeine use, likely worsening anxiety  - possible cognitive impairment   Plan:  #schizoaffective disorder, bipolar type r/o bipolar with paranoid PD  - continue ability 30 mg qdaily in AM, states this has been the best med for mood stabilization for him in past 10 years - extensive hx of prior meds, most of which he states helped at least somewhat - endorses allergy to haldol based on possible adverse reaction when 7yo  # anxiety disorder - Klonopin taper as follows 0.25 qhs x7d for week 1 (0.5mg tab, pt instructed to split in half), then 0.25 q2days x7d for week 2, then 0.25 q3days x7d for week 3, then 0.25 qhs q4 days for week 4, then stop.  - c/w gabapentin 300 mg bid PRN acute anxiety (alternative to klonopin); RAB discussed  - ISTOP reviewed; of note, last script was klonopin 0.5 mg bid on 8/31/24; prior script was 0.5 mg bid on 06/01/24  #nicotine use disorder  - cigarette use (about 2 cigarettes per day) and vaping use  - start nicotine patch 7mg qdaily; RAB discussed  - start nicotine lozenge 2mg qhs PRN   #caffeine use, 2-3 cups of coffee a day - decreasing  - likely contributing to anxiety; discussed risks of caffeine for worsening anxiety (states this is why he was taking higher dose of klonopin in AM) - pt started drinking decaf coffee   #[facility]

## 2024-11-19 NOTE — HISTORY OF PRESENT ILLNESS
[FreeTextEntry1] : Preferred phone (cell): [phone] Cc: "I'm more tuned in" (more focused)   Kaushik Selby is a 52 yo M with PMH, single, no kids, unfinished high school (was in special ed), used to be a  for 30 years, last worked in 2017, lives alone in supportive housing since 2008, PMH of cellulitis, enuresis and urinary retention, s/p suprapubic catheter in 1/2024, hx of hypothyroid (previously on levothyroxine, states that his doctor states he no longer needs levothyroxine supplementation), PPH of reported bipolar disorder vs schizoaffective disorder bipolar type, hx of multiple inpatient psychiatric admissions (most recently in 2016 at [facility] for reported depression, previously in 2001 at [place] x2m, then transferred [facility] x4m involuntary, then transferred to [facility] x2m), hx outpt psychiatric treatment (at [facility] x18y, has had Dr. Miller in the past), hx of multiple medication trials (Seroquel, Depakote, Topamax, lithium; reports he has an allergy to haldol), family hx of biological father with reported bipolar disorder (r/o PTSD), prescribed Abilify 30 mg (x10 years) and Klonopin 0.5mg bid by [facility], who presents to [facility] outpatient for medication management.  On interview, pt presents as polite, and cooperative on interview. Speech was concrete.  Pt describes improved sxs of anxiety since starting gabapentin; states that he also decreased his coffee consumption. States that he would like to get off of klonopin, because it makes him feel "stoned." Discussed risks/benefits of klonopin and plan for taper. Patient denies acute sxs of panic attacks, depression, or carlie. Sleep and appetite stable. States that he would also like to cut down on cigarettes and vaping, but hasn't started patch yet. Recommended patch and lozenge. Provided supportive psychotherapy. Patient denies current or prior suicidal ideation, homicidal ideation, NSSIB.   Pt endorses adherence to medications, denies side effects.   Preferred RX: [facility] [id number] [facility]

## 2024-11-19 NOTE — PHYSICAL EXAM
[Average] : average [Cooperative] : cooperative [Anxious] : anxious [Irritable] : irritability [Overproductive] : overproductive [Linear/Goal Directed] : linear/goal directed [MR] : MR [WNL] : within normal limits [de-identified] : below average

## 2024-11-27 NOTE — PLAN
[FreeTextEntry2] : Goal 1. The patient will recognize and improve daily symptoms of his anxiety, and bipolar 1 diagnosis as evidenced by an improvement in scores on mental health scales reviewed in therapy sessions every 1 year   Obj 1- The patient will attend therapy sessions every 2-3 weeks and will review symptoms and coping skills during sessions. The patient will practice 1 meaningful activity a day  Obj 2- The patient will take daily meds as prescribed and meet with psychiatrist as scheduled   Goal 2: The patient will increase socialization    Obj 1- The patient will start attending [facility] at least 1/week   Obj 2 - The therapist and the patient will discuss progress his socialization progress during therapy sessions     [Cognitive and/or Behavior Therapy] : Cognitive and/or Behavior Therapy  [Motivational Interviewing] : Motivational Interviewing  [Psychodynamic Therapy] : Psychodynamic Therapy  [Psychoeducation] : Psychoeducation  [Skills training (all types)] : Skills training (all types)  [Supportive Therapy] : Supportive Therapy [de-identified] : The therapist met the patient for the session in person. During the session the patient was focused on his "new issue" and that is his newly assigned  at his supportive housing. The patient shared that he didn't feel comfortable having a new . During the last session the patient was provided with a letter stating that he wanted to switch to a different worker and the patient was informed that the patient wouldn't be accommodated because that wasn't an option. During the session the therapist requested the therapist to call Rosie at [phone] to inform her that the patient is willing to work with the new  but wasn't willing to come to the conference on Dec 6 or 9. The therapist contacted the person and left a detailed voicemail. During the session the therapist and the patient discussed the situation at length, the therapist encouraged the patient to try to work with whoever was assigned to him since that is the only option, the patient finally agreed. During the session lots of support and education were provided during the session. The patient takes medications as prescribed. Throughout the session the patient was provided with active listening, motivational interviewing, emotional support and empathy.  [Recommended Frequency of Visits: ____] : Recommended frequency of visits: [unfilled] [Return in ____ week(s)] : Return in [unfilled] week(s) [FreeTextEntry1] : The therapist will meet the patient every 2-3 weeks

## 2024-11-27 NOTE — PLAN
[FreeTextEntry2] : Goal 1. The patient will recognize and improve daily symptoms of his anxiety, and bipolar 1 diagnosis as evidenced by an improvement in scores on mental health scales reviewed in therapy sessions every 1 year   Obj 1- The patient will attend therapy sessions every 2-3 weeks and will review symptoms and coping skills during sessions. The patient will practice 1 meaningful activity a day  Obj 2- The patient will take daily meds as prescribed and meet with psychiatrist as scheduled   Goal 2: The patient will increase socialization    Obj 1- The patient will start attending [facility] at least 1/week   Obj 2 - The therapist and the patient will discuss progress his socialization progress during therapy sessions     [Cognitive and/or Behavior Therapy] : Cognitive and/or Behavior Therapy  [Motivational Interviewing] : Motivational Interviewing  [Psychodynamic Therapy] : Psychodynamic Therapy  [Psychoeducation] : Psychoeducation  [Skills training (all types)] : Skills training (all types)  [Supportive Therapy] : Supportive Therapy [de-identified] : The therapist met the patient for the session in person. During the session the patient was focused on his "new issue" and that is his newly assigned  at his supportive housing. The patient shared that he didn't feel comfortable having a new . During the last session the patient was provided with a letter stating that he wanted to switch to a different worker and the patient was informed that the patient wouldn't be accommodated because that wasn't an option. During the session the therapist requested the therapist to call Rosie at [phone] to inform her that the patient is willing to work with the new  but wasn't willing to come to the conference on Dec 6 or 9. The therapist contacted the person and left a detailed voicemail. During the session the therapist and the patient discussed the situation at length, the therapist encouraged the patient to try to work with whoever was assigned to him since that is the only option, the patient finally agreed. During the session lots of support and education were provided during the session. The patient takes medications as prescribed. Throughout the session the patient was provided with active listening, motivational interviewing, emotional support and empathy.  [Recommended Frequency of Visits: ____] : Recommended frequency of visits: [unfilled] [Return in ____ week(s)] : Return in [unfilled] week(s) [FreeTextEntry1] : The therapist will meet the patient every 2-3 weeks

## 2024-12-09 NOTE — ADDENDUM
[FreeTextEntry1] : This health assessment was completed over the phone, pt was assessed, pt is axo4, calm, cooperative. Pt stated height and weight. Active problems and medical history reviewed. Pt endorsed "enuresis, urinary retention, suprapubic tube insertion 1/2024, cellulitis, hypothyroidism." Health education and nutrition counseling provided, pt verbalized understanding. Pt denies any other concerns at this time. Narcan education and administration training offered, pt refused.

## 2024-12-09 NOTE — DISCUSSION/SUMMARY
[Yes] : Yes [No] : No [Advised to schedule] : Advised to schedule [No, advised to schedule] : No, advised to schedule [Does patient use tobacco products?] : Patient uses tobacco products [Patient offered brief counseling regarding smoking cessation] : patient offered brief counseling regarding smoking cessation [Does patient use medical marijuana?] : Patient does not use medical marijuana. [Patient has been tested for HIV?] : Patient has not been tested for HIV [Patient has been tested for Hepatitis?] : Patient has not been tested for hepatitis [Patient would like to be tested/re-tested?] : patient would not like to be tested/re-tested [Current or past COVID-19 diagnosis?] : Patient had a present or past COVID-19 diagnosis [Vaccinated?] : is vaccinated [Education provided about COVID-19?] : Education provided about COVID-19 [FreeTextEntry5] :  "enuresis, urinary retention, suprapubic tube insertion 1/2024, cellulitis, hypothyroidism."

## 2024-12-12 NOTE — PHYSICAL EXAM
[Individual reports tobacco use during the last 30 days?] : Individual reports tobacco use during the last 30 days? Yes [Individual reports use of the following tobacco products during the last 30 days?] : Individual reports use of the following tobacco products during the last 30 days? Yes -  [Cigarettes] : Cigarettes [Individual reports current use of tobacco cessation medication or nicotine replacement therapy?] : Individual reports current use of tobacco cessation medication or nicotine replacement therapy? No [Was tobacco cessation medication and/or nicotine replacement therapy recommended?] : Was tobacco cessation medication and/or nicotine replacement therapy recommended? Yes [Does individual accept referral to MD for cessation medication or NRT?] : Does individual accept referral to MD for cessation medication or NRT? No

## 2024-12-12 NOTE — REASON FOR VISIT
[Other Location: e.g. Home (Enter Location, City,State)___] : The patient, [unfilled], was located at [unfilled] at the time of the visit. [Patient] : Patient [FreeTextEntry4] : 130pm [FreeTextEntry5] : 230pm [FreeTextEntry1] : therapy f/u

## 2024-12-12 NOTE — PLAN
[Cognitive and/or Behavior Therapy] : Cognitive and/or Behavior Therapy  [Motivational Interviewing] : Motivational Interviewing  [Psychodynamic Therapy] : Psychodynamic Therapy  [Psychoeducation] : Psychoeducation  [Skills training (all types)] : Skills training (all types)  [Supportive Therapy] : Supportive Therapy [Recommended Frequency of Visits: ____] : Recommended frequency of visits: [unfilled] [Return in ____ week(s)] : Return in [unfilled] week(s) [FreeTextEntry2] : Goal 1. The patient will recognize and improve daily symptoms of his anxiety, and bipolar 1 diagnosis as evidenced by an improvement in scores on mental health scales reviewed in therapy sessions every 1 year   Obj 1- The patient will attend therapy sessions every 2-3 weeks and will review symptoms and coping skills during sessions. The patient will practice 1 meaningful activity a day  Obj 2- The patient will take daily meds as prescribed and meet with psychiatrist as scheduled   Goal 2: The patient will increase socialization    Obj 1- The patient will start attending [facility] at least 1/week   Obj 2 - The therapist and the patient will discuss progress his socialization progress during therapy sessions     [de-identified] : The patient came for his session in person. The patient was at his baseline, very talkative, somewhat paranoid about protecting his information from being stolen. The patient also spoke about his low self-esteem that got developed after his "stomach surgery."  During the session the patient couldn't focus on one topic and was supposed to be redirected many times during the session. Also, the patient shared that " i need a woman in my life" yet stating that he is not ready and changing his mind for future plans and needs. Since the patient mentioned that he feels lonely the patient was provided the contact info to [employer] once again, the patient agreed to go to the [employer] for the tour to see what they have to offer. The therapist and the patient discussed planning and ways for the patient to reach his goals. The patient takes medications as prescribed. Throughout the session the patient was provided with active listening, motivational interviewing, emotional support and empathy.  [FreeTextEntry1] : The therapist will meet the patient every 2-3 weeks

## 2024-12-12 NOTE — END OF VISIT
[Duration of Psychotherapy Visit (minutes spent in synchronous communication): ____] : Duration of Psychotherapy Visit (minutes spent in synchronous communication): [unfilled] [Individual Psychotherapy for 53+ minutes] : Individual Psychotherapy for 53+ minutes  [FreeTextEntry3] : office [FreeTextEntry5] : office

## 2024-12-12 NOTE — PLAN
[Cognitive and/or Behavior Therapy] : Cognitive and/or Behavior Therapy  [Motivational Interviewing] : Motivational Interviewing  [Psychodynamic Therapy] : Psychodynamic Therapy  [Psychoeducation] : Psychoeducation  [Skills training (all types)] : Skills training (all types)  [Supportive Therapy] : Supportive Therapy [Recommended Frequency of Visits: ____] : Recommended frequency of visits: [unfilled] [Return in ____ week(s)] : Return in [unfilled] week(s) [FreeTextEntry2] : Goal 1. The patient will recognize and improve daily symptoms of his anxiety, and bipolar 1 diagnosis as evidenced by an improvement in scores on mental health scales reviewed in therapy sessions every 1 year   Obj 1- The patient will attend therapy sessions every 2-3 weeks and will review symptoms and coping skills during sessions. The patient will practice 1 meaningful activity a day  Obj 2- The patient will take daily meds as prescribed and meet with psychiatrist as scheduled   Goal 2: The patient will increase socialization    Obj 1- The patient will start attending [facility] at least 1/week   Obj 2 - The therapist and the patient will discuss progress his socialization progress during therapy sessions     [de-identified] : The patient came for his session in person. The patient was at his baseline, very talkative, somewhat paranoid about protecting his information from being stolen. The patient also spoke about his low self-esteem that got developed after his "stomach surgery."  During the session the patient couldn't focus on one topic and was supposed to be redirected many times during the session. Also, the patient shared that " i need a woman in my life" yet stating that he is not ready and changing his mind for future plans and needs. Since the patient mentioned that he feels lonely the patient was provided the contact info to [employer] once again, the patient agreed to go to the [employer] for the tour to see what they have to offer. The therapist and the patient discussed planning and ways for the patient to reach his goals. The patient takes medications as prescribed. Throughout the session the patient was provided with active listening, motivational interviewing, emotional support and empathy.  [FreeTextEntry1] : The therapist will meet the patient every 2-3 weeks

## 2024-12-20 NOTE — PLAN
[FreeTextEntry2] : Goal 1. The patient will recognize and improve daily symptoms of his anxiety, and bipolar 1 diagnosis as evidenced by an improvement in scores on mental health scales reviewed in therapy sessions every 1 year   Obj 1- The patient will attend therapy sessions every 2-3 weeks and will review symptoms and coping skills during sessions. The patient will practice 1 meaningful activity a day  Obj 2- The patient will take daily meds as prescribed and meet with psychiatrist as scheduled   Goal 2: The patient will increase socialization    Obj 1- The patient will start attending [facility] at least 1/week   Obj 2 - The therapist and the patient will discuss progress his socialization progress during therapy sessions     [Cognitive and/or Behavior Therapy] : Cognitive and/or Behavior Therapy  [Motivational Interviewing] : Motivational Interviewing  [Psychodynamic Therapy] : Psychodynamic Therapy  [Psychoeducation] : Psychoeducation  [Skills training (all types)] : Skills training (all types)  [Supportive Therapy] : Supportive Therapy [de-identified] : The therapist met the patient in person. The patient reported that he is doing ok but sometimes feels lonely, " i need a woman". The therapist reminded the patient about the [facility] contact information for the patient where he would be able to socialize, find new friends. The patient stated that he did reach out to the [employer], and he is planning to attend an educational tour. The patient talks a lot during his sessions, sometimes repeating what he already stated, in detail. The patient has to be redirected often during the session because the patient loses focus of the discussion. Also, the patient got himself Klonopin and started gabapentin instead as per the psychiatrist recommendation. In addition, the patient tries to keep himself busy, watches news and sport on tv, and looks at his collectable baseball cards. The patient takes medications as prescribed. Throughout the session the patient was provided with active listening, motivational interviewing, emotional support and empathy.  [Recommended Frequency of Visits: ____] : Recommended frequency of visits: [unfilled] [Return in ____ week(s)] : Return in [unfilled] week(s) [FreeTextEntry1] : The therapist will meet the patient every 2-3 weeks

## 2024-12-20 NOTE — REASON FOR VISIT
[Other Location: e.g. Home (Enter Location, City,State)___] : The patient, [unfilled], was located at [unfilled] at the time of the visit. [FreeTextEntry4] : 130pm [FreeTextEntry5] : 230pm [Patient] : Patient [FreeTextEntry1] : therapy f/u

## 2024-12-20 NOTE — PLAN
[FreeTextEntry2] : Goal 1. The patient will recognize and improve daily symptoms of his anxiety, and bipolar 1 diagnosis as evidenced by an improvement in scores on mental health scales reviewed in therapy sessions every 1 year   Obj 1- The patient will attend therapy sessions every 2-3 weeks and will review symptoms and coping skills during sessions. The patient will practice 1 meaningful activity a day  Obj 2- The patient will take daily meds as prescribed and meet with psychiatrist as scheduled   Goal 2: The patient will increase socialization    Obj 1- The patient will start attending [facility] at least 1/week   Obj 2 - The therapist and the patient will discuss progress his socialization progress during therapy sessions     [Cognitive and/or Behavior Therapy] : Cognitive and/or Behavior Therapy  [Motivational Interviewing] : Motivational Interviewing  [Psychodynamic Therapy] : Psychodynamic Therapy  [Psychoeducation] : Psychoeducation  [Skills training (all types)] : Skills training (all types)  [Supportive Therapy] : Supportive Therapy [de-identified] : The therapist met the patient in person. The patient reported that he is doing ok but sometimes feels lonely, " i need a woman". The therapist reminded the patient about the [facility] contact information for the patient where he would be able to socialize, find new friends. The patient stated that he did reach out to the [employer], and he is planning to attend an educational tour. The patient talks a lot during his sessions, sometimes repeating what he already stated, in detail. The patient has to be redirected often during the session because the patient loses focus of the discussion. Also, the patient got himself Klonopin and started gabapentin instead as per the psychiatrist recommendation. In addition, the patient tries to keep himself busy, watches news and sport on tv, and looks at his collectable baseball cards. The patient takes medications as prescribed. Throughout the session the patient was provided with active listening, motivational interviewing, emotional support and empathy.  [Recommended Frequency of Visits: ____] : Recommended frequency of visits: [unfilled] [Return in ____ week(s)] : Return in [unfilled] week(s) [FreeTextEntry1] : The therapist will meet the patient every 2-3 weeks

## 2024-12-31 NOTE — DISCUSSION/SUMMARY
[Low acute suicide risk] : Low acute suicide risk [Yes] : Yes [FreeTextEntry1] : Kaushik Selby is a 50 yo M with PMH, single, no kids, unfinished high school (was in special ed), used to be a  for 30 years, last worked in 2017, lives alone in supportive housing since 2008, PMH of cellulitis, enuresis and urinary retention, s/p suprapubic catheter in 1/2024, hx of hypothyroid (previously on levothyroxine, states that his doctor states he no longer needs levothyroxine supplementation), PPH of reported bipolar disorder vs schizoaffective disorder bipolar type, hx of multiple inpatient psychiatric admissions (most recently in 2016 at [facility] for reported depression, previously in 2001 at [place] x2m, then transferred [facility] x4m involuntary, then transferred to [facility] x2m), hx outpt psychiatric treatment (at [facility] x18y, has had Dr. Miller in the past), hx of multiple medication trials (Seroquel, Depakote, Topamax, lithium; reports he has an allergy to haldol), family hx of biological father with reported bipolar disorder (r/o PTSD), prescribed Abilify 30 mg (x10 years) and Klonopin 0.5mg bid by [facility], who presents to [facility] outpatient for medication management.  On initial interview, patient describes hx consistent with bipolar disorder vs schizoaffective disorder bipolar type. Speech is pressured, suspected due to anxiety, but patient does not present as acutely manic, depressed, or overtly psychotic. Patient describes at least 1 prior hospitalization for carlie. Although pt denies a hx of AH/VH, patient presents as paranoid (unclear if due to psychosis or due to personality disorder) and describes a hx of possible erotomaniac delusions (states that >20 years ago, he felt that he needed to  a woman after 4 months that he states that he barely knew). Patient denies any current or prior SI/HI/NSSIB. Patient endorses daily nicotine use and caffeine use, denies other substance use. Discussed risks associated with chronic benzo use including but not limited to tolerance, withdrawal sxs, cognitive impairment. Discussed importance of adherence to abilify for mood stabilization. Pt expressed understanding and agreement with plan.   Impression: - schizoaffective disorder, bipolar type  - r/o bipolar disorder and paranoid personality disorder  - anxiety disorder, unspecified r/o SIMD with anxious distress - nicotine use disorder, cigarettes (<1/2 PPD) and nicotine vaping  - caffeine use, likely worsening anxiety  - possible cognitive impairment vs ID  Plan:  #schizoaffective disorder, bipolar type r/o bipolar with paranoid PD  - continue ability 30 mg qdaily in AM, states this has been the best med for mood stabilization for him in past 10 years - extensive hx of prior meds, most of which he states helped at least somewhat - endorses allergy to haldol based on possible adverse reaction when 7yo  # anxiety disorder - Klonopin taper as follows 0.25 qhs x7d for week 1 (0.5mg tab, pt instructed to split in half), then 0.25 q2days x7d for week 2, then 0.25 q3days x7d for week 3, then 0.25 qhs q4 days for week 4, then stop.  - c/w gabapentin 300 mg bid PRN acute anxiety (alternative to klonopin); RAB discussed  - ISTOP reviewed; of note, last script was klonopin 0.5 mg bid on 8/31/24; prior script was 0.5 mg bid on 06/01/24  #nicotine use disorder  - cigarette use (about 2 cigarettes per day) and vaping use  - start nicotine patch 7mg qdaily; RAB discussed  - start nicotine lozenge 2mg qhs PRN   #caffeine use, 2-3 cups of coffee a day - decreasing  - likely contributing to anxiety; discussed risks of caffeine for worsening anxiety (states this is why he was taking higher dose of klonopin in AM) - pt started drinking decaf coffee   #f/u - [facility] pharmacy

## 2024-12-31 NOTE — HISTORY OF PRESENT ILLNESS
[FreeTextEntry1] : Preferred phone (cell): [phone] Cc: "I'm off the klonopin!" (for about 1m)   Kaushik Selby is a 52 yo M with PMH, single, no kids, unfinished high school (was in special ed), used to be a  for 30 years, last worked in 2017, lives alone in supportive housing since 2008, PMH of cellulitis, enuresis and urinary retention, s/p suprapubic catheter in 1/2024, hx of hypothyroid (previously on levothyroxine, states that his doctor states he no longer needs levothyroxine supplementation), PPH of reported bipolar disorder vs schizoaffective disorder bipolar type, hx of multiple inpatient psychiatric admissions (most recently in 2016 at [facility] for reported depression, previously in 2001 at [place] x2m, then transferred [facility] x4m involuntary, then transferred to [facility] x2m), hx outpt psychiatric treatment (at [facility] x18y, has had Dr. Miller in the past), hx of multiple medication trials (Seroquel, Depakote, Topamax, lithium; reports he has an allergy to haldol), family hx of biological father with reported bipolar disorder (r/o PTSD), prescribed Abilify 30 mg (x10 years) and Klonopin 0.5mg bid by [facility], who presents to [facility] outpatient for medication management.  On interview, pt presents as polite, and cooperative on interview. Speech was concrete.  States that he is off of the klonopin for about 1 month, denies acute sxs of withdrawals. Patient denies acute sxs of panic attacks, depression, or carlie. Sleep and appetite stable. Provided supportive psychotherapy. Patient denies current or prior suicidal ideation, homicidal ideation, NSSIB.   Pt endorses adherence to medications, denies side effects.   Preferred RX: [facility] [address] [facility]

## 2024-12-31 NOTE — PHYSICAL EXAM
[Average] : average [Cooperative] : cooperative [Anxious] : anxious [Irritable] : irritability [Overproductive] : overproductive [Linear/Goal Directed] : linear/goal directed [MR] : MR [WNL] : within normal limits [de-identified] : below average

## 2025-01-10 NOTE — END OF VISIT
[Duration of Psychotherapy Visit (minutes spent in synchronous communication): ____] : Duration of Psychotherapy Visit (minutes spent in synchronous communication): [unfilled] [Individual Psychotherapy for 16-37 minutes] : Individual Psychotherapy for 16-37 minutes [FreeTextEntry3] : office [FreeTextEntry5] : office

## 2025-01-10 NOTE — REASON FOR VISIT
[Other Location: e.g. Home (Enter Location, City,State)___] : The patient, [unfilled], was located at [unfilled] at the time of the visit. [FreeTextEntry4] : 12pm [FreeTextEntry5] : 1230pm [Patient] : Patient [FreeTextEntry1] : therapy f/u

## 2025-01-10 NOTE — PLAN
[FreeTextEntry2] : Goal 1. The patient will recognize and improve daily symptoms of his anxiety, and bipolar 1 diagnosis as evidenced by an improvement in scores on mental health scales reviewed in therapy sessions every 1 year   Obj 1- The patient will attend therapy sessions every 2-3 weeks and will review symptoms and coping skills during sessions. The patient will practice 1 meaningful activity a day  Obj 2- The patient will take daily meds as prescribed and meet with psychiatrist as scheduled   Goal 2: The patient will increase socialization    Obj 1- The patient will start attending [facility] at least 1/week   Obj 2 - The therapist and the patient will discuss progress his socialization progress during therapy sessions     [Cognitive and/or Behavior Therapy] : Cognitive and/or Behavior Therapy  [Motivational Interviewing] : Motivational Interviewing  [Psychodynamic Therapy] : Psychodynamic Therapy  [Psychoeducation] : Psychoeducation  [Skills training (all types)] : Skills training (all types)  [Supportive Therapy] : Supportive Therapy [de-identified] : The therapist met the patient in person for the session. The patient reported that he has been sick since last week. The patient had chills, pain in his rib cage and so on. The therapist asked the patient why he came if he was sick, and the patient stated that he canceled his therapy last week and he didn't want to cancel once again. Overall, the patient stated that he is doing ok, even though his anxiety is 7/10 and his depression is 4/10, coping skills discussed as well as the patient was reminded about [employer] so that the patient has more social interactions and get involved with meaningful activities. The patient stated that he keeps himself busy with walks, watching movies, and going through his baseball cards. The patient gets along with his new housing  well. The patient takes medications as prescribed. Throughout the session the patient was provided with active listening, motivational interviewing, emotional support and empathy.  [Recommended Frequency of Visits: ____] : Recommended frequency of visits: [unfilled] [Return in ____ week(s)] : Return in [unfilled] week(s) [FreeTextEntry1] : The therapist will meet the patient every 2-3 weeks

## 2025-01-10 NOTE — PLAN
[FreeTextEntry2] : Goal 1. The patient will recognize and improve daily symptoms of his anxiety, and bipolar 1 diagnosis as evidenced by an improvement in scores on mental health scales reviewed in therapy sessions every 1 year   Obj 1- The patient will attend therapy sessions every 2-3 weeks and will review symptoms and coping skills during sessions. The patient will practice 1 meaningful activity a day  Obj 2- The patient will take daily meds as prescribed and meet with psychiatrist as scheduled   Goal 2: The patient will increase socialization    Obj 1- The patient will start attending [facility] at least 1/week   Obj 2 - The therapist and the patient will discuss progress his socialization progress during therapy sessions     [Cognitive and/or Behavior Therapy] : Cognitive and/or Behavior Therapy  [Motivational Interviewing] : Motivational Interviewing  [Psychodynamic Therapy] : Psychodynamic Therapy  [Psychoeducation] : Psychoeducation  [Skills training (all types)] : Skills training (all types)  [Supportive Therapy] : Supportive Therapy [de-identified] : The therapist met the patient in person for the session. The patient reported that he has been sick since last week. The patient had chills, pain in his rib cage and so on. The therapist asked the patient why he came if he was sick, and the patient stated that he canceled his therapy last week and he didn't want to cancel once again. Overall, the patient stated that he is doing ok, even though his anxiety is 7/10 and his depression is 4/10, coping skills discussed as well as the patient was reminded about [employer] so that the patient has more social interactions and get involved with meaningful activities. The patient stated that he keeps himself busy with walks, watching movies, and going through his baseball cards. The patient gets along with his new housing  well. The patient takes medications as prescribed. Throughout the session the patient was provided with active listening, motivational interviewing, emotional support and empathy.  [Recommended Frequency of Visits: ____] : Recommended frequency of visits: [unfilled] [Return in ____ week(s)] : Return in [unfilled] week(s) [FreeTextEntry1] : The therapist will meet the patient every 2-3 weeks

## 2025-01-27 NOTE — PLAN
[FreeTextEntry2] : Goal 1. The patient will recognize and improve daily symptoms of his anxiety, and bipolar 1 diagnosis as evidenced by an improvement in scores on mental health scales reviewed in therapy sessions every 1 year   Obj 1- The patient will attend therapy sessions every 2-3 weeks and will review symptoms and coping skills during sessions. The patient will practice 1 meaningful activity a day  Obj 2- The patient will take daily meds as prescribed and meet with psychiatrist as scheduled   Goal 2: The patient will increase socialization    Obj 1- The patient will start attending [facility] at least 1/week   Obj 2 - The therapist and the patient will discuss progress his socialization progress during therapy sessions     [Cognitive and/or Behavior Therapy] : Cognitive and/or Behavior Therapy  [Motivational Interviewing] : Motivational Interviewing  [Psychodynamic Therapy] : Psychodynamic Therapy  [Psychoeducation] : Psychoeducation  [Skills training (all types)] : Skills training (all types)  [Supportive Therapy] : Supportive Therapy [de-identified] : The patient came in person for her session. The patient reported that his mood is "excellent" and he is looking forward to seeing his relatives for some of the family gatherings. The patient also reported that he is off Klonopin and really happy about it. At this time the patient takes Gabapentin, stated that he did some research on the medication to understand why this medication was good for him. The patient was assured that if he was prescribed the medication that was the best choice for him. Also, the patient has a number of medical appointments including dental on 2/1/25.  The therapist continues to encourage the patient to start going to [employer] since the patient stated that he would like to date and socialize more. The patient takes medications as prescribed. Throughout the session the patient was provided with active listening, motivational interviewing, emotional support and empathy.  [Recommended Frequency of Visits: ____] : Recommended frequency of visits: [unfilled] [Return in ____ week(s)] : Return in [unfilled] week(s) [FreeTextEntry1] : The therapist will meet the patient every 2-3 weeks

## 2025-02-20 NOTE — HISTORY OF PRESENT ILLNESS
[FreeTextEntry1] : Preferred phone (cell): [phone] Cc: "I'm off the klonopin!" (for about 1m)   Kaushik Selby is a 50 yo M with PMH, single, no kids, unfinished high school (was in special ed), used to be a  for 30 years, last worked in 2017, lives alone in supportive housing since 2008, PMH of cellulitis, enuresis and urinary retention, s/p suprapubic catheter in 1/2024, hx of hypothyroid (previously on levothyroxine, states that his doctor states he no longer needs levothyroxine supplementation), PPH of reported bipolar disorder vs schizoaffective disorder bipolar type, hx of multiple inpatient psychiatric admissions (most recently in 2016 at [facility] for reported depression, previously in 2001 at [place] x2m, then transferred [facility] x4m involuntary, then transferred to [facility] x2m), hx outpt psychiatric treatment (at [facility] x18y, has had Dr. Miller in the past), hx of multiple medication trials (Seroquel, Depakote, Topamax, lithium; reports he has an allergy to haldol), family hx of biological father with reported bipolar disorder (r/o PTSD), prescribed Abilify 30 mg (x10 years) and Klonopin 0.5mg bid by [facility], who presents to [facility] outpatient for medication management.  On interview, pt presents as polite, and cooperative on interview. Speech was concrete.  States that he is off of the klonopin for about 1 month, denies acute sxs of withdrawals. Patient denies acute sxs of panic attacks, depression, or carlie. Sleep and appetite stable. Provided supportive psychotherapy. Patient denies current or prior suicidal ideation, homicidal ideation, NSSIB.   Pt endorses adherence to medications, denies side effects.   Preferred RX: [facility] [address] [facility]

## 2025-02-20 NOTE — PHYSICAL EXAM
[Average] : average [Cooperative] : cooperative [Anxious] : anxious [Irritable] : irritability [Overproductive] : overproductive [Linear/Goal Directed] : linear/goal directed [MR] : MR [WNL] : within normal limits [de-identified] : below average

## 2025-02-20 NOTE — DISCUSSION/SUMMARY
[Low acute suicide risk] : Low acute suicide risk [Yes] : Yes [FreeTextEntry1] : Kaushik Selby is a 52 yo M with PMH, single, no kids, unfinished high school (was in special ed), used to be a  for 30 years, last worked in 2017, lives alone in supportive housing since 2008, PMH of cellulitis, enuresis and urinary retention, s/p suprapubic catheter in 1/2024, hx of hypothyroid (previously on levothyroxine, states that his doctor states he no longer needs levothyroxine supplementation), PPH of reported bipolar disorder vs schizoaffective disorder bipolar type, hx of multiple inpatient psychiatric admissions (most recently in 2016 at [facility] for reported depression, previously in 2001 at [place] x2m, then transferred [facility] x4m involuntary, then transferred to [facility] x2m), hx outpt psychiatric treatment (at [facility] x18y, has had Dr. Miller in the past), hx of multiple medication trials (Seroquel, Depakote, Topamax, lithium; reports he has an allergy to haldol), family hx of biological father with reported bipolar disorder (r/o PTSD), prescribed Abilify 30 mg (x10 years) and Klonopin 0.5mg bid by [facility], who presents to [facility] outpatient for medication management.  On initial interview, patient describes hx consistent with bipolar disorder vs schizoaffective disorder bipolar type. Speech is pressured, suspected due to anxiety, but patient does not present as acutely manic, depressed, or overtly psychotic. Patient describes at least 1 prior hospitalization for carlie. Although pt denies a hx of AH/VH, patient presents as paranoid (unclear if due to psychosis or due to personality disorder) and describes a hx of possible erotomaniac delusions (states that >20 years ago, he felt that he needed to  a woman after 4 months that he states that he barely knew). Patient denies any current or prior SI/HI/NSSIB. Patient endorses daily nicotine use and caffeine use, denies other substance use. Discussed risks associated with chronic benzo use including but not limited to tolerance, withdrawal sxs, cognitive impairment. Discussed importance of adherence to abilify for mood stabilization. Pt expressed understanding and agreement with plan.   Impression: - schizoaffective disorder, bipolar type  - r/o bipolar disorder and paranoid personality disorder  - anxiety disorder, unspecified r/o SIMD with anxious distress - nicotine use disorder, cigarettes (<1/2 PPD) and nicotine vaping  - caffeine use, likely worsening anxiety  - possible cognitive impairment vs ID  Plan:  #schizoaffective disorder, bipolar type r/o bipolar with paranoid PD  - continue ability 30 mg qdaily in AM, states this has been the best med for mood stabilization for him in past 10 years - extensive hx of prior meds, most of which he states helped at least somewhat - endorses allergy to haldol based on possible adverse reaction when 7yo  # anxiety disorder - c/w gabapentin 300 mg bid PRN acute anxiety (alternative to klonopin); ROBERTO discussed   #nicotine use disorder  - cigarette use (about 2 cigarettes per day) and vaping use  - start nicotine patch 7mg qdaily; ROBERTO discussed  - start nicotine lozenge 2mg qhs PRN   #caffeine use, 2-3 cups of coffee a day - decreasing  - pt started drinking decaf coffee   #discontinued medications -- Klonopin discontinued (had previously been taking for >10 years). ISTOP reviewed; of note, last script was klonopin 0.5 mg qd x30d on 11/01/24. Prior prescriber was Dr. Madina Loera ([facility] Psychiatrist, was seeing prior to transferring to [facility] OPD clinic).   #f/u - [facility] pharmacy

## 2025-04-15 NOTE — HISTORY OF PRESENT ILLNESS
[FreeTextEntry1] : Preferred phone (cell): [phone] Cc: "sometimes I have sleeping issues" (sleeps for 2 hours, then wakes for 2 hours, then sleeps again)  On interview, pt presents as polite, and cooperative on interview. Speech was concrete.  States that his mood is stable. Patient denies acute sxs of panic attacks, depression, or carlie. Sleep and appetite stable, but complains of waking up after about 2 hours at night, and then taking about 2 hours to fall back asleep. States that he sleeps about 8 hours total a night. States that after his evening awakenings, he will sometimes drink coffee; advised to avoid coffee in evenings. Provided supportive psychotherapy. Patient denies current or prior suicidal ideation, homicidal ideation, NSSIB.   Pt endorses adherence to medications, denies side effects.   Preferred RX: [facility] [address] [facility]

## 2025-04-15 NOTE — PHYSICAL EXAM
[Average] : average [Cooperative] : cooperative [Anxious] : anxious [Irritable] : irritability [Overproductive] : overproductive [Linear/Goal Directed] : linear/goal directed [MR] : MR [WNL] : within normal limits [de-identified] : below average

## 2025-04-15 NOTE — DISCUSSION/SUMMARY
[Low acute suicide risk] : Low acute suicide risk [Yes] : Yes [FreeTextEntry1] : Kaushik Selby is a 52 yo M with PMH, single, no kids, unfinished high school (was in special ed), used to be a  for 30 years, last worked in 2017, lives alone in supportive housing since 2008, PMH of cellulitis, enuresis and urinary retention, s/p suprapubic catheter in 1/2024, hx of hypothyroid (previously on levothyroxine, states that his doctor states he no longer needs levothyroxine supplementation), PPH of reported bipolar disorder vs schizoaffective disorder bipolar type, hx of multiple inpatient psychiatric admissions (most recently in 2016 at [facility] IP for reported depression, previously in 2001 at [place] x2m, then transferred [facility] x4m involuntary, then transferred to [facility] x2m), hx outpt psychiatric treatment (at [facility] x18y, has had Dr. Miller in the past), hx of multiple medication trials (Seroquel, Depakote, Topamax, lithium; reports he has an allergy to haldol), family hx of biological father with reported bipolar disorder (r/o PTSD), prescribed Abilify 30 mg (x10 years) and Klonopin 0.5mg bid by [facility], who presents to [facility] outpatient for medication management.  On initial interview, patient describes hx consistent with bipolar disorder vs schizoaffective disorder bipolar type. Speech is pressured, suspected due to anxiety, but patient does not present as acutely manic, depressed, or overtly psychotic. Patient describes at least 1 prior hospitalization for carlie. Although pt denies a hx of AH/VH, patient presents as paranoid (unclear if due to psychosis or due to personality disorder) and describes a hx of possible erotomaniac delusions (states that >20 years ago, he felt that he needed to  a woman after 4 months that he states that he barely knew). Patient denies any current or prior SI/HI/NSSIB. Patient endorses daily nicotine use and caffeine use, denies other substance use. Discussed risks associated with chronic benzo use including but not limited to tolerance, withdrawal sxs, cognitive impairment. Discussed importance of adherence to abilify for mood stabilization. Pt expressed understanding and agreement with plan.   Impression: - schizoaffective disorder, bipolar type  - r/o bipolar disorder and paranoid personality disorder  - anxiety disorder, unspecified r/o SIMD with anxious distress - nicotine use disorder, cigarettes (<1/2 PPD) and nicotine vaping  - caffeine use, likely worsening anxiety  - possible cognitive impairment vs ID  Plan:  #schizoaffective disorder, bipolar type r/o bipolar with paranoid PD  - continue ability 30 mg qdaily in AM, states this has been the best med for mood stabilization for him in past 10 years - start seroquel  qhs PRN insomnia  - extensive hx of prior meds, most of which he states helped at least somewhat - endorses allergy to haldol based on possible adverse reaction when 7yo  # anxiety disorder - c/w gabapentin 300 mg bid PRN acute anxiety (taking as standing medicaiton)  #nicotine use disorder  - cigarette use (about 2 cigarettes per day) and vaping use  - start nicotine patch 7mg qdaily; RAB discussed  - start nicotine lozenge 2mg qhs PRN   #caffeine use, 2-3 cups of coffee a day - decreasing  - pt started drinking decaf coffee   #discontinued medications -- Klonopin discontinued (had previously been taking for >10 years). ISTOP reviewed; of note, last script was klonopin 0.5 mg qd x30d on 11/01/24. Prior prescriber was Dr. Madina Loera ([facility] Psychiatrist, was seeing prior to transferring to [facility] OPD clinic).   #f/u - [facility] pharmacy

## 2025-05-16 NOTE — DISCUSSION/SUMMARY
[FreeTextEntry1] : Outreached pt to set up new appt (pt cancelled yesterday's appt). Pt denied acute complaints. F/u appt scheduled.

## 2025-06-03 NOTE — PHYSICAL EXAM
[Average] : average [Cooperative] : cooperative [Anxious] : anxious [Irritable] : irritability [Overproductive] : overproductive [Linear/Goal Directed] : linear/goal directed [MR] : MR [WNL] : within normal limits [de-identified] : below average

## 2025-06-03 NOTE — DISCUSSION/SUMMARY
[Low acute suicide risk] : Low acute suicide risk [Yes] : Yes [FreeTextEntry1] : Kaushik Selby is a 50 yo M with PMH, single, no kids, unfinished high school (was in special ed), used to be a  for 30 years, last worked in 2017, lives alone in supportive housing since 2008, PMH of cellulitis, enuresis and urinary retention, s/p suprapubic catheter in 1/2024, hx of hypothyroid (previously on levothyroxine, states that his doctor states he no longer needs levothyroxine supplementation), PPH of reported bipolar disorder vs schizoaffective disorder bipolar type, hx of multiple inpatient psychiatric admissions (most recently in 2016 at [facility] IP for reported depression, previously in 2001 at [place] x2m, then transferred [facility] x4m involuntary, then transferred to [facility] x2m), hx outpt psychiatric treatment (at [facility] x18y, has had Dr. Miller in the past), hx of multiple medication trials (Seroquel, Depakote, Topamax, lithium; reports he has an allergy to haldol), family hx of biological father with reported bipolar disorder (r/o PTSD), prescribed Abilify 30 mg (x10 years) and Klonopin 0.5mg bid by [facility], who presents to [facility] outpatient for medication management.  On initial interview, patient describes hx consistent with bipolar disorder vs schizoaffective disorder bipolar type. Speech is pressured, suspected due to anxiety, but patient does not present as acutely manic, depressed, or overtly psychotic. Patient describes at least 1 prior hospitalization for carlie. Although pt denies a hx of AH/VH, patient presents as paranoid (unclear if due to psychosis or due to personality disorder) and describes a hx of possible erotomaniac delusions (states that >20 years ago, he felt that he needed to  a woman after 4 months that he states that he barely knew). Patient denies any current or prior SI/HI/NSSIB. Patient endorses daily nicotine use and caffeine use, denies other substance use. Discussed risks associated with chronic benzo use including but not limited to tolerance, withdrawal sxs, cognitive impairment. Discussed importance of adherence to abilify for mood stabilization. Pt expressed understanding and agreement with plan.   Impression: - schizoaffective disorder, bipolar type  - r/o bipolar disorder and paranoid personality disorder  - anxiety disorder, unspecified r/o SIMD with anxious distress - nicotine use disorder, cigarettes (<1/2 PPD) and nicotine vaping  - caffeine use, likely worsening anxiety  - possible cognitive impairment vs ID  Plan:  #schizoaffective disorder, bipolar type r/o bipolar with paranoid PD  - continue ability 30 mg qdaily in AM, states this has been the best med for mood stabilization for him in past 10 years - start seroquel  qhs PRN insomnia  - extensive hx of prior meds, most of which he states helped at least somewhat - endorses allergy to haldol based on possible adverse reaction when 7yo - discussed transition of care - RTC ion 6 weeks  # anxiety disorder - c/w gabapentin 300 mg bid PRN acute anxiety (taking as standing med; allowed pt to stop klonopin)  #nicotine use disorder  - cigarette use (about 2 cigarettes per day) and vaping use  - start nicotine patch 7mg qdaily; RAB discussed  - start nicotine lozenge 2mg qhs PRN   #caffeine use, 2-3 cups of coffee a day - decreasing  - pt started drinking decaf coffee   #discontinued medications -- Klonopin discontinued (had previously been taking for >10 years). ISTOP reviewed; of note, last script was klonopin 0.5 mg qd x30d on 11/01/24. Prior prescriber was Dr. Madina Loera ([facility] Psychiatrist, was seeing prior to transferring to [facility] OPD clinic).   #f/u - [facility] pharmacy

## 2025-06-03 NOTE — HISTORY OF PRESENT ILLNESS
[FreeTextEntry1] :  Cc: "Im good!"   On interview, pt presents as polite, and cooperative on interview. Speech was concrete.  States that his mood is stable. Patient denies acute sxs of panic attacks, depression, or carlie. States that at times he feels depressed for up to a few hours at a time; denies feeling down for longer periods of time since last appt. States that sleep has improved significantly since last appt despite not yet starting seroquel (states he had an issue picking it up at pharmacy). Patient denies current or prior suicidal ideation, homicidal ideation, NSSIB. States he hasn't started nicotine patch or gum yet because not covered by insurance and doesn't want to pay out of pocket at this time.   Pt endorses adherence to medications, denies side effects.